# Patient Record
(demographics unavailable — no encounter records)

---

## 2024-12-12 NOTE — ADDENDUM
[FreeTextEntry1] : This note was written by Dominik George on 12/12/2024 acting as scribe for Dr. Aravind Anderson M.D.  I, Aravind Anderson MD, have read and attest that all the information, medical decision making and discharge instructions within are true and accurate.

## 2024-12-12 NOTE — DISCUSSION/SUMMARY
[de-identified] : Cervicalgia. Left lumbar radiculopathy. Chiropractic care and NSAIDs did not relieve symptoms. Discussed all options. Lumbar MRI referral. F/U after MRI. All options discussed including rest, medicine, home exercise, acupuncture, Chiropractic care, Physical Therapy, Pain management, and last resort surgery. All questions were answered, all alternatives discussed, and the patient is in complete agreement with the treatment plan which the patient contributed to and discussed with me through the shared decision-making process. Follow-up appointment as instructed. Any issues and the patient will call or come in sooner.

## 2024-12-12 NOTE — PHYSICAL EXAM
[Normal] : Gait: normal [Cota's Sign] : negative Cota's sign [Pronator Drift] : negative pronator drift [SLR] : negative straight leg raise [de-identified] : 5 out of 5 motor strength, sensation is intact and symmetrical full range of motion flexion extension and rotation, no palpatory tenderness full range of motion of hips knees shoulders and elbows (all four extremities), no atrophy, negative straight leg raise, no pathological reflexes, no swelling, normal ambulation, no apparent distress skin is intact, no palpable lymph nodes, no upper or lower extremity instability, alert and oriented x3 and normal mood. Normal finger-to nose test. No upper motor neuron findings. [de-identified] : He is refusing XRs today. He states he recently had XRs done with his chiropractor but was unable to bring the images with him today.

## 2024-12-12 NOTE — HISTORY OF PRESENT ILLNESS
[de-identified] : 34 year old male presents for evaluation of neck pain x 3 years and lower back pain x 5 years. He notes that his lower back pain had worsened x 2 years after lifting an item. He notes that the pain radiates down the LLE posteriorly to the knee. Denies radiation of pain down the arms. Denies numbness/tingling.  Denies weakness of arms and legs. Standing, sitting, walking. bending aggravates the pain. Does not take medications for the pain. Has been participating with chiropractic care for the past 2 years, 1-4x/week. He notes that he feels temporary relief. Denies ISAK. PMHx: denies significant medical history He works as a .  No fever chills sweats nausea vomiting no bowel or bladder dysfunction, no recent weight loss or gain no night pain. This history is in addition to the intake form that I personally reviewed.

## 2024-12-23 NOTE — HISTORY OF PRESENT ILLNESS
[Stable] : stable [de-identified] : 34 year old male presents for evaluation of neck pain x 3 years and lower back pain x 5 years. He notes that his lower back pain had worsened x 2 years after lifting an item. He notes that the pain radiates down the LLE posteriorly to the knee. Denies radiation of pain down the arms. Denies numbness/tingling.  Denies weakness of arms and legs. Standing, sitting, walking. bending aggravates the pain. Does not take medications for the pain. Has been participating with chiropractic care for the past 2 years, 1-4x/week. He notes that he feels temporary relief. Denies ISAK. Presents today for MRI Lumbar review.  PMHx: denies significant medical history He works as a .  No fever chills sweats nausea vomiting no bowel or bladder dysfunction, no recent weight loss or gain no night pain. This history is in addition to the intake form that I personally reviewed.

## 2024-12-23 NOTE — DISCUSSION/SUMMARY
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 Chiropractic care and NSAIDs did not relieve symptoms. Left L5 radiculopathy. Discussed all options. MDPx2. Referred to Dr. Rivas Aguilera/Dr. Raad Haley for pain management. Discussed L4-5 laminectomy/discectomy. Risks of surgery include infection, dural tear, nerve root injury, reherniation, future leg pain, future back pain, retained fragment, hematoma, urinary retention, worsening leg symptoms, foot drop, anesthetic risks, blood transfusion risks, positioning pain, visceral vascular injury, deep vein thrombosis, pulmonary embolus, and death. All risks were explained not exclusive to the ones mentioned alternatives were discussed and all questions were answered the patient agrees and understands the above and is in complete agreement with the plan. All options discussed including rest, medicine, home exercise, acupuncture, Chiropractic care, Physical Therapy, Pain management, and last resort surgery. All questions were answered, all alternatives discussed, and the patient is in complete agreement with the treatment plan which the patient contributed to and discussed with me through the shared decision-making process. Follow-up appointment as instructed. Any issues and the patient will call or come in sooner.  23

## 2024-12-23 NOTE — PHYSICAL EXAM
[Normal] : Gait: normal [Cota's Sign] : negative Cota's sign [Pronator Drift] : negative pronator drift [SLR] : negative straight leg raise [de-identified] : 5 out of 5 motor strength, sensation is intact and symmetrical full range of motion flexion extension and rotation, no palpatory tenderness full range of motion of hips knees shoulders and elbows (all four extremities), no atrophy, negative straight leg raise, no pathological reflexes, no swelling, normal ambulation, no apparent distress skin is intact, no palpable lymph nodes, no upper or lower extremity instability, alert and oriented x3 and normal mood. Normal finger-to nose test. No upper motor neuron findings. [de-identified] : I reviewed, interpreted and clinically correlated the following outside imaging studies, 12/17/2024 EXAM: MRI-LUMBAR SPINE NON CONTRAST   (Maninder Hatch)  HISTORY: Lower back pain COMPARISON: None TECHNIQUE: Multiplanar, multisequence MRI of the lumbar spine was performed without the administration of  intravenous contrast. FINDINGS: The vertebral bodies are normally aligned. Vertebral heights are maintained. Bone marrow signal is  normal. There is preservation of intervertebral disc height. The visualized spinal cord demonstrates  normal signal intensity. The conus medullaris terminates at the expected level. At L1-2: No significant spinal canal or neural foraminal stenosis. At L2-3: Disc bulge mildly narrows the neural foramen. There is no central canal stenosis. At L3-4: No significant spinal canal or neural foraminal stenosis. At L4-5: Central extruded type disc herniation with a posterior annular fissure moderately narrowing the  spinal canal, encroaching upon the descending L5 nerve roots within the subarticular recesses and mildly  narrow the neural foramen. At L5-S1: No significant spinal canal or neural foraminal stenosis. IMPRESSION: 1. At L2-3: Disc bulge mildly narrows the neural foramen. 2. At L4-5: Central extruded type disc herniation with a posterior annular fissure moderately narrowing  the spinal canal, encroaching upon the descending L5 nerve roots within the subarticular recesses and  mildly narrow the neural foramen.

## 2024-12-23 NOTE — ADDENDUM
[FreeTextEntry1] : This note was written by Dominik George on 12/23/2024 acting as scribe for Dr. Aravind Anderson M.D.  I, Aravind Anderson MD, have read and attest that all the information, medical decision making and discharge instructions within are true and accurate.